# Patient Record
Sex: MALE | Race: WHITE | Employment: FULL TIME | URBAN - METROPOLITAN AREA
[De-identification: names, ages, dates, MRNs, and addresses within clinical notes are randomized per-mention and may not be internally consistent; named-entity substitution may affect disease eponyms.]

---

## 2019-06-29 ENCOUNTER — HOSPITAL ENCOUNTER (EMERGENCY)
Facility: CLINIC | Age: 24
Discharge: HOME | End: 2019-06-29
Attending: EMERGENCY MEDICINE
Payer: COMMERCIAL

## 2019-06-29 ENCOUNTER — APPOINTMENT (OUTPATIENT)
Dept: GENERAL RADIOLOGY | Facility: CLINIC | Age: 24
End: 2019-06-29
Payer: COMMERCIAL

## 2019-06-29 VITALS
BODY MASS INDEX: 39.17 KG/M2 | WEIGHT: 315 LBS | SYSTOLIC BLOOD PRESSURE: 154 MMHG | HEIGHT: 75 IN | OXYGEN SATURATION: 98 % | DIASTOLIC BLOOD PRESSURE: 88 MMHG | HEART RATE: 86 BPM | RESPIRATION RATE: 16 BRPM | TEMPERATURE: 97 F

## 2019-06-29 DIAGNOSIS — R07.9 CHEST PAIN, UNSPECIFIED TYPE: Primary | ICD-10-CM

## 2019-06-29 LAB
MYOGLOBIN: 30 NG/ML (ref 28–72)
TROPONIN INTERP: NORMAL
TROPONIN T: NORMAL NG/ML
TROPONIN, HIGH SENSITIVITY: <6 NG/L (ref 0–22)

## 2019-06-29 PROCEDURE — 83874 ASSAY OF MYOGLOBIN: CPT

## 2019-06-29 PROCEDURE — 99285 EMERGENCY DEPT VISIT HI MDM: CPT

## 2019-06-29 PROCEDURE — 71045 X-RAY EXAM CHEST 1 VIEW: CPT

## 2019-06-29 PROCEDURE — 36415 COLL VENOUS BLD VENIPUNCTURE: CPT

## 2019-06-29 PROCEDURE — 84484 ASSAY OF TROPONIN QUANT: CPT

## 2019-06-29 PROCEDURE — 93005 ELECTROCARDIOGRAM TRACING: CPT | Performed by: EMERGENCY MEDICINE

## 2019-06-29 ASSESSMENT — PAIN DESCRIPTION - FREQUENCY: FREQUENCY: CONTINUOUS

## 2019-06-29 ASSESSMENT — PAIN SCALES - GENERAL: PAINLEVEL_OUTOF10: 6

## 2019-06-29 ASSESSMENT — PAIN DESCRIPTION - LOCATION: LOCATION: CHEST

## 2019-06-29 ASSESSMENT — PAIN DESCRIPTION - DESCRIPTORS: DESCRIPTORS: PRESSURE;ACHING

## 2019-06-29 ASSESSMENT — PAIN DESCRIPTION - ORIENTATION: ORIENTATION: LEFT

## 2019-06-29 ASSESSMENT — PAIN DESCRIPTION - PAIN TYPE: TYPE: ACUTE PAIN

## 2019-06-29 NOTE — ED PROVIDER NOTES
eMERGENCY dEPARTMENT eNCOUnter      Pt Name: Ana Paula Bucio  MRN: [id number]  [place] 1995  Date of evaluation: 6/29/2019      CHIEF COMPLAINT       Chief Complaint   Patient presents with    Chest Pain     0250    Shortness of Breath         HISTORY OF PRESENT ILLNESS    Ana Paula Bucio is a 24 y.o. male who presents chest pain. Patient states he went to lay down and developed this sharp chest pain at what he describes as the diaphragm area. No radiation. Took his breath away, was initially 8 out of 10. Currently, about a 4 out of 10. No nausea no vomiting. No diaphoresis, no lightheadedness. No prior history similar events in the past         REVIEW OF SYSTEMS       Review of systems are reviewed and negative except stated above in HPI     [address]    has a past medical history of Asthma. SURGICAL HISTORY      has a past surgical history that includes [place] tooth extraction. CURRENT MEDICATIONS     There are no discharge medications for this patient. ALLERGIES     has No Known Allergies. FAMILY HISTORY     has no family status information on file. family history is not on file. SOCIAL HISTORY      reports that he has never smoked. He has never used smokeless tobacco. He reports that he drinks alcohol. He reports that he does not use drugs. PHYSICAL EXAM     INITIAL VITALS:  height is 6' 3\" (1.905 m) and weight is 154.2 kg (340 lb) (abnormal). His oral temperature is 97 °F (36.1 °C). His blood pressure is 154/88 (abnormal) and his pulse is 86. His respiration is 16 and oxygen saturation is 98%. Gen.: Patient is well-hydrated, nontoxic-appearing, obese male in no apparent distress. HEENT: Head is atraumatic. Mouth shows moist mucous membranes. Neck: Supple. Respiratory: Lung sounds are clear bilateral.  Cardiac: Heart is regular rate and rhythm. Chest wall has no reproducible components. GI: Abdomen is obese, soft and nontender.   Peripheral exam no cyanosis or edema. Neuro: Patient is no gross focal neurological deficits    DIFFERENTIAL DIAGNOSIS/ MDM:     GERD. Atypical cardiac muscular skeletal strain    DIAGNOSTIC RESULTS     EKG: All EKG's are interpreted by the Emergency Department Physician who either signs or Co-signs this chart in the absence of a cardiologist.    .  EKG interpreted by me, reveals normal sinus rhythm, rate 81, with no acute ST elevations or depressions. Normal NJ interval, normal axis complex, normal axis    RADIOLOGY:   I directly visualized the following  images and reviewed the radiologist interpretations:  XR CHEST PORTABLE   Final Result   No acute cardiopulmonary abnormality. LABS:  Labs Reviewed   TROP/MYOGLOBIN         EMERGENCY DEPARTMENT COURSE:   Vitals:    Vitals:    06/29/19 0323 06/29/19 0353 06/29/19 0402 06/29/19 0423   BP:  (!) 179/91 (!) 149/82 (!) 154/88   Pulse: 83 88 87 86   Resp: 16 16 15 16   Temp: 97 °F (36.1 °C)      TempSrc: Oral      SpO2: 98% 98% 97% 98%   Weight: (!) 154.2 kg (340 lb)      Height: 6' 3\" (1.905 m)        -------------------------  BP: (!) 154/88, Temp: 97 °F (36.1 °C), Pulse: 86, Resp: 16    No orders of the defined types were placed in this encounter. Re-evaluation Notes    EKG is negative. Troponin is negative. Chest x-ray is negative. Patient is feeling better. Atypical presentation. No risk factors. No indication that this is acute cardiac event vascular event. No indication of PE. He will be discharged with early follow-up and return if worse        FINAL IMPRESSION      1. Chest pain, unspecified type          DISPOSITION/PLAN   DISPOSITION Decision To Discharge 06/29/2019 04:14:52 AM      Condition on Disposition    Stable    PATIENT REFERRED TO:  No follow-up provider specified. DISCHARGE MEDICATIONS:  There are no discharge medications for this patient.       (Please note that portions of this note were completed with a voice recognition program. Efforts were made to edit the dictations but occasionally words are mis-transcribed.)    Weber MD, F.A.C.E.P.   Attending Emergency Physician        Suhas Morrow MD  06/29/19 0035

## 2019-06-29 NOTE — LETTER
[facility] ED  [address] [place] [address]  Phone: [phone]               June 29, 2019    Patient: Ana Paula Bucio   YOB: 1995   Date of Visit: 6/29/2019       To Whom It May Concern:    Ana Paula Bucio was seen and treated in our emergency department on 6/29/2019.  He may return to work on 6-30-19  Sincerely,       Carola Norris RN         Signature:__________________________________

## 2019-07-01 LAB
EKG ATRIAL RATE: 81 BPM
EKG P AXIS: 38 DEGREES
EKG P-R INTERVAL: 162 MS
EKG Q-T INTERVAL: 372 MS
EKG QRS DURATION: 96 MS
EKG QTC CALCULATION (BAZETT): 432 MS
EKG R AXIS: 58 DEGREES
EKG T AXIS: 28 DEGREES
EKG VENTRICULAR RATE: 81 BPM